# Patient Record
Sex: FEMALE | Race: WHITE | Employment: FULL TIME | URBAN - METROPOLITAN AREA
[De-identification: names, ages, dates, MRNs, and addresses within clinical notes are randomized per-mention and may not be internally consistent; named-entity substitution may affect disease eponyms.]

---

## 2017-03-31 ENCOUNTER — HOSPITAL ENCOUNTER (OUTPATIENT)
Dept: LAB | Age: 22
Discharge: HOME | End: 2017-03-31
Payer: OTHER GOVERNMENT

## 2017-03-31 ENCOUNTER — OFFICE VISIT (OUTPATIENT)
Dept: FAMILY MEDICINE CLINIC | Age: 22
End: 2017-03-31

## 2017-03-31 VITALS
HEIGHT: 65 IN | RESPIRATION RATE: 16 BRPM | TEMPERATURE: 98.9 F | HEART RATE: 84 BPM | DIASTOLIC BLOOD PRESSURE: 62 MMHG | OXYGEN SATURATION: 99 % | SYSTOLIC BLOOD PRESSURE: 102 MMHG | WEIGHT: 200.8 LBS | BODY MASS INDEX: 33.45 KG/M2

## 2017-03-31 DIAGNOSIS — N92.3 SPOTTING BETWEEN MENSES: ICD-10-CM

## 2017-03-31 DIAGNOSIS — Z13.1 SCREENING FOR DIABETES MELLITUS: ICD-10-CM

## 2017-03-31 DIAGNOSIS — Z13.220 SCREENING FOR HYPERLIPIDEMIA: ICD-10-CM

## 2017-03-31 DIAGNOSIS — R53.83 FATIGUE, UNSPECIFIED TYPE: Primary | ICD-10-CM

## 2017-03-31 DIAGNOSIS — R42 DIZZINESS: ICD-10-CM

## 2017-03-31 LAB
25(OH)D3 SERPL-MCNC: 36.6 NG/ML (ref 30–100)
ALBUMIN SERPL BCP-MCNC: 3.9 G/DL (ref 3.4–5)
ALBUMIN/GLOB SERPL: 1.2 {RATIO} (ref 0.8–1.7)
ALP SERPL-CCNC: 74 U/L (ref 45–117)
ALT SERPL-CCNC: 19 U/L (ref 13–56)
ANION GAP BLD CALC-SCNC: 7 MMOL/L (ref 3–18)
AST SERPL W P-5'-P-CCNC: 13 U/L (ref 15–37)
BILIRUB SERPL-MCNC: 0.2 MG/DL (ref 0.2–1)
BUN SERPL-MCNC: 14 MG/DL (ref 7–18)
BUN/CREAT SERPL: 18 (ref 12–20)
CALCIUM SERPL-MCNC: 9.5 MG/DL (ref 8.5–10.1)
CHLORIDE SERPL-SCNC: 108 MMOL/L (ref 100–108)
CHOLEST SERPL-MCNC: 147 MG/DL
CO2 SERPL-SCNC: 26 MMOL/L (ref 21–32)
CREAT SERPL-MCNC: 0.8 MG/DL (ref 0.6–1.3)
ERYTHROCYTE [DISTWIDTH] IN BLOOD BY AUTOMATED COUNT: 12.6 % (ref 11.6–14.5)
EST. AVERAGE GLUCOSE BLD GHB EST-MCNC: 97 MG/DL
GLOBULIN SER CALC-MCNC: 3.2 G/DL (ref 2–4)
GLUCOSE SERPL-MCNC: 88 MG/DL (ref 74–99)
HBA1C MFR BLD: 5 % (ref 4.2–5.6)
HCT VFR BLD AUTO: 42.1 % (ref 35–45)
HDLC SERPL-MCNC: 51 MG/DL (ref 40–60)
HDLC SERPL: 2.9 {RATIO} (ref 0–5)
HGB BLD-MCNC: 14 G/DL (ref 12–16)
LDLC SERPL CALC-MCNC: 89.4 MG/DL (ref 0–100)
LIPID PROFILE,FLP: NORMAL
MCH RBC QN AUTO: 31.3 PG (ref 24–34)
MCHC RBC AUTO-ENTMCNC: 33.3 G/DL (ref 31–37)
MCV RBC AUTO: 94 FL (ref 74–97)
PLATELET # BLD AUTO: 271 K/UL (ref 135–420)
PMV BLD AUTO: 10.2 FL (ref 9.2–11.8)
POTASSIUM SERPL-SCNC: 4.2 MMOL/L (ref 3.5–5.5)
PROT SERPL-MCNC: 7.1 G/DL (ref 6.4–8.2)
RBC # BLD AUTO: 4.48 M/UL (ref 4.2–5.3)
SODIUM SERPL-SCNC: 141 MMOL/L (ref 136–145)
TRIGL SERPL-MCNC: 33 MG/DL (ref ?–150)
TSH SERPL DL<=0.05 MIU/L-ACNC: 1.34 UIU/ML (ref 0.36–3.74)
VLDLC SERPL CALC-MCNC: 6.6 MG/DL
WBC # BLD AUTO: 8.3 K/UL (ref 4.6–13.2)

## 2017-03-31 PROCEDURE — 80053 COMPREHEN METABOLIC PANEL: CPT | Performed by: FAMILY MEDICINE

## 2017-03-31 PROCEDURE — 85027 COMPLETE CBC AUTOMATED: CPT | Performed by: FAMILY MEDICINE

## 2017-03-31 PROCEDURE — 36415 COLL VENOUS BLD VENIPUNCTURE: CPT | Performed by: FAMILY MEDICINE

## 2017-03-31 PROCEDURE — 80061 LIPID PANEL: CPT | Performed by: FAMILY MEDICINE

## 2017-03-31 PROCEDURE — 82306 VITAMIN D 25 HYDROXY: CPT | Performed by: FAMILY MEDICINE

## 2017-03-31 PROCEDURE — 83036 HEMOGLOBIN GLYCOSYLATED A1C: CPT | Performed by: FAMILY MEDICINE

## 2017-03-31 PROCEDURE — 84443 ASSAY THYROID STIM HORMONE: CPT | Performed by: FAMILY MEDICINE

## 2017-03-31 RX ORDER — ACETAMINOPHEN 500 MG
TABLET ORAL
COMMUNITY

## 2017-03-31 RX ORDER — IBUPROFEN 200 MG
600 TABLET ORAL
COMMUNITY

## 2017-03-31 NOTE — PROGRESS NOTES
HISTORY OF PRESENT ILLNESS  Kym Mueller is a 22 y.o. female. HPI: here as a new patient with c/o feeling dizziness since one week. Sudden onset. Denies any cold symptoms. Noted with change in position mainly sitting to standing. Last for few seconds. Herself spins around. Was happening few times a day but now it has improved some. Not associated with headache, nausea or vomiting. No vision changes. Does wear prescription glasses. No chest pain or sob. No ext weakness. No urinary or bowel complains. No abdominal pain. Concern with fatigue lately. Wanted to get check her thyroid level. No known thyroid abnormality. Denies any heat or cold intolerance. No mood changes. Feels like she is eating healthy and doing exercise and hard time loosing weight. Has has lost weight before but now struggling. 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 Drinking fair amount of water. Having spotting between period. First time this month. Has nexplanon placed last year by ob/gyn. First episode. ROS: see HPI     Physical Exam   Constitutional: She is oriented to person, place, and time. No distress. HENT:   Ears: TM intact ,normal light reflex bilaterally    Neck: No thyromegaly present. Cardiovascular: Normal rate, regular rhythm and normal heart sounds. Pulmonary/Chest:   CTA   Abdominal: Soft. Bowel sounds are normal. There is no tenderness. Musculoskeletal: She exhibits no edema. Lymphadenopathy:     She has no cervical adenopathy. Neurological: She is oriented to person, place, and time. Psychiatric: Her behavior is normal.       ASSESSMENT and PLAN    ICD-10-CM ICD-9-CM    1. Fatigue, unspecified type: will check labs and f/u after lab result. R53.83 780.79 TSH 3RD GENERATION      CBC W/O DIFF      METABOLIC PANEL, COMPREHENSIVE      VITAMIN D, 25 HYDROXY   2. Spotting between menses: for now observe. First time episode. On nexplanon. Could be side effects.  Placed one year ago. Will observe and if persist will consider ob/gyn follow up. N92.0 626.6    3. Dizziness: could be vertigo. For now will observe. Ordered labs. Advised to eat on time and drink more fluid. Recommend meclizine as needed but she wants to hold off of it. R42 780.4 TSH 3RD GENERATION      CBC W/O DIFF      METABOLIC PANEL, COMPREHENSIVE   4. Screening for diabetes mellitus Z13.1 V77.1 HEMOGLOBIN A1C WITH EAG   5. Screening for hyperlipidemia Z13.220 V77.91 LIPID PANEL   Pt understood and agrees with above plan. Review HM   She will provide immunization records. She thinks he had all 3 shots of HPV. Will check records. Follow-up Disposition:  Return in about 1 month (around 4/30/2017).

## 2017-03-31 NOTE — PROGRESS NOTES
1. Have you been to the ER, urgent care clinic since your last visit? Hospitalized since your last visit? No    2. Have you seen or consulted any other health care providers outside of the [address] [place] [address] since your last visit? Include any pap smears or colon screening. Yes OB/GYN, 3/2016      Patient had flu vaccine in 9/2016. Patient was not sure if her grandparents had arthritis.

## 2017-03-31 NOTE — PATIENT INSTRUCTIONS
Fatigue: Care Instructions  Your Care Instructions  Fatigue is a feeling of tiredness, exhaustion, or lack of energy. You may feel fatigue because of too much or not enough activity. It can also come from stress, lack of sleep, boredom, and poor diet. Many medical problems, such as viral infections, can cause fatigue. Emotional problems, especially depression, are often the cause of fatigue. Fatigue is most often a symptom of another problem. Treatment for fatigue depends on the cause. For example, if you have fatigue because you have a certain health problem, treating this problem also treats your fatigue. If depression or anxiety is the cause, treatment may help. Follow-up care is a key part of your treatment and safety. Be sure to make and go to all appointments, and call your doctor if you are having problems. It's also a good idea to know your test results and keep a list of the medicines you take. How can you care for yourself at home? · Get regular exercise. But don't overdo it. Go back and forth between rest and exercise. · Get plenty of rest.  · Eat a healthy diet. Do not skip meals, especially breakfast.  · Reduce your use of caffeine, tobacco, and alcohol. Caffeine is most often found in coffee, tea, cola drinks, and chocolate. · Limit medicines that can cause fatigue. This includes tranquilizers and cold and allergy medicines. When should you call for help? Watch closely for changes in your health, and be sure to contact your doctor if:  · You have new symptoms such as fever or a rash. · Your fatigue gets worse. · You have been feeling down, depressed, or hopeless. Or you may have lost interest in things that you usually enjoy. · You are not getting better as expected. Where can you learn more? Go to http://michael-coco.info/. Enter R272 in the search box to learn more about \"Fatigue: Care Instructions. \"  Current as of: May 27, 2016  Content Version: 11.2  © 1614-9945 Healthwise, [employer]. Care instructions adapted under license by [employer] (which disclaims liability or warranty for this information). If you have questions about a medical condition or this instruction, always ask your healthcare professional. [address] any warranty or liability for your use of this information. Vertigo: Care Instructions  Your Care Instructions  Vertigo is the feeling that you or your surroundings are moving when there is no actual movement. It is often described as a feeling of spinning, whirling, falling, or tilting. Vertigo may make you vomit or feel nauseated. You may have trouble standing or walking and may lose your balance. Vertigo is often related to an inner ear problem, but it can have other more serious causes. If vertigo continues, you may need more tests to find its cause. Follow-up care is a key part of your treatment and safety. Be sure to make and go to all appointments, and call your doctor if you are having problems. Its also a good idea to know your test results and keep a list of the medicines you take. How can you care for yourself at home? · Do not lie flat on your back. Prop yourself up slightly. This may reduce the spinning feeling. Keep your eyes open. · Move slowly so that you do not fall. · If your doctor recommends medicine, take it exactly as directed. · Do not drive while you are having vertigo. Certain exercises, called Olvera-Daroff exercises, can help decrease vertigo. To do Olvera-Daroff exercises:  · Sit on the edge of a bed or sofa and quickly lie down on the side that causes the worst vertigo. Lie on your side with your ear down. · Stay in this position for at least 30 seconds or until the vertigo goes away. · Sit up. If this causes vertigo, wait for it to stop. · Repeat the procedure on the other side. · Repeat this 10 times.  Do these exercises 2 times a day until the vertigo is gone.  When should you call for help? Call 911 anytime you think you may need emergency care. For example, call if:  · You passed out (lost consciousness). · You have symptoms of a stroke. These may include:  ¨ Sudden numbness, tingling, weakness, or loss of movement in your face, arm, or leg, especially on only one side of your body. ¨ Sudden vision changes. ¨ Sudden trouble speaking. ¨ Sudden confusion or trouble understanding simple statements. ¨ Sudden problems with walking or balance. ¨ A sudden, severe headache that is different from past headaches. Call your doctor now or seek immediate medical care if:  · Vertigo occurs with a fever, a headache, or ringing in your ears. · You have new or increased nausea and vomiting. Watch closely for changes in your health, and be sure to contact your doctor if:  · Vertigo gets worse or happens more often. · Vertigo has not gotten better after 2 weeks. Where can you learn more? Go to http://michael-coco.info/. Enter P866 in the search box to learn more about \"Vertigo: Care Instructions. \"  Current as of: July 29, 2016  Content Version: 11.2  © 6298-6949 [employer]. Care instructions adapted under license by [employer] (which disclaims liability or warranty for this information). If you have questions about a medical condition or this instruction, always ask your healthcare professional. [place] [address] any warranty or liability for your use of this information.

## 2017-03-31 NOTE — MR AVS SNAPSHOT
Visit Information Date & Time Provider Department Dept. Phone Encounter #  
 3/31/2017  1:45 PM Raman Gonzalez, [address] [id number] Follow-up Instructions Return in about 1 month (around 4/30/2017). Follow-up and Disposition History Upcoming Health Maintenance Date Due  
 HPV AGE 9Y-34Y (1 of 3 - Female 3 Dose Series) 5/27/2007 Hepatitis A Peds Age 1-18 (2 of 3 - Twinrix 3-Dose Series) 4/28/2017 DTaP/Tdap/Td series (2 - Td) 4/28/2017 Allergies as of 3/31/2017  Review Complete On: 3/31/2017 By: Idella Closs No Known Allergies Current Immunizations  Never Reviewed Name Date Influenza Vaccine 9/1/2016 Not reviewed this visit You Were Diagnosed With   
  
 Codes Comments Fatigue, unspecified type    -  Primary ICD-10-CM: R53.83 ICD-9-CM: 780.79 Spotting between menses     ICD-10-CM: N92.0 ICD-9-CM: 626.6 Dizziness     ICD-10-CM: A27 ICD-9-CM: 780.4 Screening for diabetes mellitus     ICD-10-CM: Z13.1 ICD-9-CM: V77.1 Screening for hyperlipidemia     ICD-10-CM: Z13.220 ICD-9-CM: V77.91 Vitals BP Pulse Temp Resp Height(growth percentile) Weight(growth percentile) 102/62 (BP 1 Location: Left arm, BP Patient Position: Sitting) 84 98.9 °F (37.2 °C) (Oral) 16 5' 5.25\" (1.657 m) 200 lb 12.8 oz (91.1 kg) LMP SpO2 BMI OB Status Smoking Status 03/06/2017 99% 33.16 kg/m2 Having regular periods Never Smoker BMI and BSA Data Body Mass Index Body Surface Area  
 33.16 kg/m 2 2.05 m 2 Your Updated Medication List  
  
   
This list is accurate as of: 3/31/17  2:39 PM.  Always use your most recent med list.  
  
  
  
  
 ibuprofen 200 mg tablet Commonly known as:  MOTRIN Take  by mouth. NEXPLANON 68 mg Impl Generic drug:  etonogestrel  
by SubDERmal route. TYLENOL EXTRA STRENGTH 500 mg tablet Generic drug:  acetaminophen Take  by mouth every six (6) hours as needed for Pain. Follow-up Instructions Return in about 1 month (around 4/30/2017). To-Do List   
 03/31/2017 Lab:  CBC W/O DIFF   
  
 03/31/2017 Lab:  HEMOGLOBIN A1C WITH EAG   
  
 03/31/2017 Lab:  LIPID PANEL   
  
 03/31/2017 Lab:  METABOLIC PANEL, COMPREHENSIVE   
  
 03/31/2017 Lab:  TSH 3RD GENERATION   
  
 03/31/2017 Lab:  VITAMIN D, 25 HYDROXY Patient Instructions Fatigue: Care Instructions Your Care Instructions Fatigue is a feeling of tiredness, exhaustion, or lack of energy. You may feel fatigue because of too much or not enough activity. It can also come from stress, lack of sleep, boredom, and poor diet. Many medical problems, such as viral infections, can cause fatigue. Emotional problems, especially depression, are often the cause of fatigue. Fatigue is most often a symptom of another problem. Treatment for fatigue depends on the cause. For example, if you have fatigue because you have a certain health problem, treating this problem also treats your fatigue. If depression or anxiety is the cause, treatment may help. Follow-up care is a key part of your treatment and safety. Be sure to make and go to all appointments, and call your doctor if you are having problems. It's also a good idea to know your test results and keep a list of the medicines you take. How can you care for yourself at home? · Get regular exercise. But don't overdo it. Go back and forth between rest and exercise. · Get plenty of rest. 
· Eat a healthy diet. Do not skip meals, especially breakfast. 
· Reduce your use of caffeine, tobacco, and alcohol. Caffeine is most often found in coffee, tea, cola drinks, and chocolate. · Limit medicines that can cause fatigue. This includes tranquilizers and cold and allergy medicines. When should you call for help?  
Watch closely for changes in your health, and be sure to contact your doctor if: 
· You have new symptoms such as fever or a rash. · Your fatigue gets worse. · You have been feeling down, depressed, or hopeless. Or you may have lost interest in things that you usually enjoy. · You are not getting better as expected. Where can you learn more? Go to http://michael-coco.info/. Enter H329 in the search box to learn more about \"Fatigue: Care Instructions. \" Current as of: May 27, 2016 Content Version: 11.2 © 9299-4603 Dr [employer]. Care instructions adapted under license by [employer] (which disclaims liability or warranty for this information). If you have questions about a medical condition or this instruction, always ask your healthcare professional. [address] any warranty or liability for your use of this information. Vertigo: Care Instructions Your Care Instructions Vertigo is the feeling that you or your surroundings are moving when there is no actual movement. It is often described as a feeling of spinning, whirling, falling, or tilting. Vertigo may make you vomit or feel nauseated. You may have trouble standing or walking and may lose your balance. Vertigo is often related to an inner ear problem, but it can have other more serious causes. If vertigo continues, you may need more tests to find its cause. Follow-up care is a key part of your treatment and safety. Be sure to make and go to all appointments, and call your doctor if you are having problems. Its also a good idea to know your test results and keep a list of the medicines you take. How can you care for yourself at home? · Do not lie flat on your back. Prop yourself up slightly. This may reduce the spinning feeling. Keep your eyes open. · Move slowly so that you do not fall. · If your doctor recommends medicine, take it exactly as directed. · Do not drive while you are having vertigo. Certain exercises, called Olvera-Daroff exercises, can help decrease vertigo. To do Olvera-Daroff exercises: · Sit on the edge of a bed or sofa and quickly lie down on the side that causes the worst vertigo. Lie on your side with your ear down. · Stay in this position for at least 30 seconds or until the vertigo goes away. · Sit up. If this causes vertigo, wait for it to stop. · Repeat the procedure on the other side. · Repeat this 10 times. Do these exercises 2 times a day until the vertigo is gone. When should you call for help? Call 911 anytime you think you may need emergency care. For example, call if: 
· You passed out (lost consciousness). · You have symptoms of a stroke. These may include: 
¨ Sudden numbness, tingling, weakness, or loss of movement in your face, arm, or leg, especially on only one side of your body. ¨ Sudden vision changes. ¨ Sudden trouble speaking. ¨ Sudden confusion or trouble understanding simple statements. ¨ Sudden problems with walking or balance. ¨ A sudden, severe headache that is different from past headaches. Call your doctor now or seek immediate medical care if: · Vertigo occurs with a fever, a headache, or ringing in your ears. · You have new or increased nausea and vomiting. Watch closely for changes in your health, and be sure to contact your doctor if: · Vertigo gets worse or happens more often. · Vertigo has not gotten better after 2 weeks. Where can you learn more? Go to http://michael-coco.info/. Enter S477 in the search box to learn more about \"Vertigo: Care Instructions. \" Current as of: July 29, 2016 Content Version: 11.2 © 7599-6516 [employer]. Care instructions adapted under license by [employer] (which disclaims liability or warranty for this information).  If you have questions about a medical condition or this instruction, always ask your healthcare professional. [employer] disclaims any warranty or liability for your use of this information. Introducing [facility]! [employer] introduces Procyrion patient portal. Now you can access parts of your medical record, email your doctor's office, and request medication refills online. 1. In your internet browser, go to [email or web address] 2. Click on the First Time User? Click Here link in the Sign In box. You will see the New Member Sign Up page. 3. Enter your Procyrion Access Code exactly as it appears below. You will not need to use this code after youve completed the sign-up process. If you do not sign up before the expiration date, you must request a new code. · Procyrion Access Code: [email or web address] Expires: 6/29/2017  8:10 AM 
 
4. Enter the last four digits of your Social Security Number (xxxx) and Date of Birth (mm/dd/yyyy) as indicated and click Submit. You will be taken to the next sign-up page. 5. Create a Procyrion ID. This will be your Procyrion login ID and cannot be changed, so think of one that is secure and easy to remember. 6. Create a Procyrion password. You can change your password at any time. 7. Enter your Password Reset Question and Answer. This can be used at a later time if you forget your password. 8. Enter your e-mail address. You will receive e-mail notification when new information is available in [address] 9. Click Sign Up. You can now view and download portions of your medical record. 10. Click the Download Summary menu link to download a portable copy of your medical information. If you have questions, please visit the Frequently Asked Questions section of the Procyrion website. Remember, Procyrion is NOT to be used for urgent needs. For medical emergencies, dial 911. Now available from your iPhone and Android! Please provide this summary of care documentation to your next provider. Your primary care clinician is listed as Ari Thrasher. If you have any questions after today's visit, please call [phone].

## 2017-04-05 NOTE — PROGRESS NOTES
Spoke with patient (2 verifiers name/) regarding labs are ok and further discussion on follow up visit. Patient voiced understanding.

## 2017-04-06 ENCOUNTER — TELEPHONE (OUTPATIENT)
Dept: FAMILY MEDICINE CLINIC | Age: 22
End: 2017-04-06

## 2017-04-06 NOTE — TELEPHONE ENCOUNTER
Pt is requesting her shot record for school. Please update her record and call her if you have any questions.

## 2018-01-03 ENCOUNTER — OFFICE VISIT (OUTPATIENT)
Dept: CARDIOLOGY CLINIC | Age: 23
End: 2018-01-03

## 2018-01-03 VITALS
SYSTOLIC BLOOD PRESSURE: 135 MMHG | DIASTOLIC BLOOD PRESSURE: 88 MMHG | BODY MASS INDEX: 31.32 KG/M2 | WEIGHT: 188 LBS | HEIGHT: 65 IN | HEART RATE: 88 BPM

## 2018-01-03 DIAGNOSIS — Z87.74 S/P PDA REPAIR: ICD-10-CM

## 2018-01-03 DIAGNOSIS — Z02.1 PRE-EMPLOYMENT EXAMINATION: Primary | ICD-10-CM

## 2018-01-03 NOTE — PROGRESS NOTES
HISTORY OF PRESENT ILLNESS  Tesha Vital is a 25 y.o. female. HPI Comments: Patient with PDA coil embolization at age 3  No problem since  On follow up patient denies any chest pains,sob, palpitation or other significant symptoms. New Patient   The history is provided by the patient. Pertinent negatives include no chest pain, no abdominal pain, no headaches and no shortness of breath. Review of Systems   Constitutional: Negative for chills and fever. HENT: Negative for nosebleeds. Eyes: Negative for blurred vision and double vision. Respiratory: Negative for cough, hemoptysis, sputum production, shortness of breath and wheezing. Cardiovascular: Negative for chest pain, palpitations, orthopnea, claudication, leg swelling and PND. Gastrointestinal: Negative for abdominal pain, heartburn, nausea and vomiting. Musculoskeletal: Negative for myalgias. Skin: Negative for rash. Neurological: Negative for dizziness, weakness and headaches. Endo/Heme/Allergies: Does not bruise/bleed easily. Family History   Problem Relation Age of Onset    Attention Deficit Disorder Mother     Hypertension Maternal Grandmother     High Cholesterol Maternal Grandmother     Thyroid Disease Maternal Grandmother     Hypertension Maternal Grandfather     High Cholesterol Maternal Grandfather        Past Medical History:   Diagnosis Date    Headache     Migraine     S/P PDA repair        Past Surgical History:   Procedure Laterality Date    CARDIAC SURG PROCEDURE UNLIST      HX HEART CATHETERIZATION      pda coil embolisation at age 3       Social History   Substance Use Topics    Smoking status: Never Smoker    Smokeless tobacco: Never Used    Alcohol use Yes      Comment: occ       No Known Allergies    Prior to Admission medications    Medication Sig Start Date End Date Taking? Authorizing Provider   etonogestrel (NEXPLANON) 68 mg impl by SubDERmal route.    Yes Historical Provider acetaminophen (TYLENOL EXTRA STRENGTH) 500 mg tablet Take  by mouth every six (6) hours as needed for Pain. Yes Historical Provider   ibuprofen (MOTRIN) 200 mg tablet Take  by mouth. Yes Historical Provider   loratadine (CLARITIN) 10 mg tablet Take 10 mg by mouth daily. Yes Phys Other, MD   NAPROXEN (NAPROSYN PO) Take  by mouth. Yes Phys Other, MD   ondansetron hcl (ZOFRAN) 4 mg tablet Take 1 Tab by mouth every eight (8) hours as needed for Nausea. 5/16/13   ADRIENNE Schafer         Visit Vitals    /88    Pulse 88    Ht 5' 5.25\" (1.657 m)    Wt 85.3 kg (188 lb)    BMI 31.05 kg/m2         Physical Exam   Constitutional: She is oriented to person, place, and time. She appears well-developed and well-nourished. HENT:   Head: Normocephalic and atraumatic. Eyes: Conjunctivae are normal.   Neck: Neck supple. No JVD present. No tracheal deviation present. No thyromegaly present. Cardiovascular: Normal rate and regular rhythm. PMI is not displaced. Exam reveals no gallop, no S3 and no decreased pulses. No murmur heard. Pulmonary/Chest: No respiratory distress. She has no wheezes. She has no rales. She exhibits no tenderness. Abdominal: Soft. There is no tenderness. Musculoskeletal: She exhibits no edema. Neurological: She is alert and oriented to person, place, and time. Skin: Skin is warm. Psychiatric: She has a normal mood and affect. Ms. Souleymane Hernandez has a reminder for a \"due or due soon\" health maintenance. I have asked that she contact her primary care provider for follow-up on this health maintenance.  ekg-1/2018  Sinus  Rhythm   -RSR(V1) -probably normal for age. PROBABLY NORMAL FOR AGE      Assessment         ICD-10-CM ICD-9-CM    1. Pre-employment examination Z02.1 V70.5 AMB POC EKG ROUTINE W/ 12 LEADS, INTER & REP    joining [employer]     2.  S/P PDA repair Z98.890 V45.89     Z87.74      coil embolisation   Asymptomatic s/p pda repair-good functional capacity  No restriction on activity  Cleared for [employer] employment    There are no discontinued medications. Orders Placed This Encounter    AMB POC EKG ROUTINE W/ 12 LEADS, INTER & REP     Order Specific Question:   Reason for Exam:     Answer:   employment       Follow-up Disposition:  Return if symptoms worsen or fail to improve.

## 2018-01-03 NOTE — MR AVS SNAPSHOT
Visit Information Date & Time Provider Department Dept. Phone Encounter #  
 1/3/2018  9:15 AM Courtney Han MD [facility] [address] [place] [address] [id number] Follow-up Instructions Return if symptoms worsen or fail to improve. Upcoming Health Maintenance Date Due  
 PAP AKA CERVICAL CYTOLOGY 5/27/2017 Influenza Age 5 to Adult 8/1/2017 DTaP/Tdap/Td series (8 - Td) 3/31/2027 Allergies as of 1/3/2018  Review Complete On: 1/3/2018 By: Courtney Han MD  
 No Known Allergies Current Immunizations  Never Reviewed Name Date DTaP 7/9/2001, 10/23/1997, 1996, 1996, 1996 HPV 7/30/2012, 11/15/2011, 1/17/2011 Hep A Vaccine 8/24/2009, 8/28/2007 Hep B Vaccine 4/22/1997, 1996, 1996 Influenza Vaccine 9/1/2016 MMR 7/9/2001, 10/23/1997 Meningococcal (MCV4O) Vaccine 11/14/2014 Meningococcal ACWY Vaccine 8/28/2007 Poliovirus vaccine 7/9/2001, 1996, 1996, 1996 Tdap 8/20/2007 Varicella Virus Vaccine 8/24/2009, 6/23/1997 Not reviewed this visit You Were Diagnosed With   
  
 Codes Comments Pre-employment examination    -  Primary ICD-10-CM: Z02.1 ICD-9-CM: V70.5 joining [employer] S/P PDA repair     ICD-10-CM: [id number], Z87.74 ICD-9-CM: V45.89 coil embolisation Vitals BP Pulse Height(growth percentile) Weight(growth percentile) BMI OB Status 135/88 88 5' 5.25\" (1.657 m) 188 lb (85.3 kg) 31.05 kg/m2 Having regular periods Smoking Status Never Smoker Vitals History BMI and BSA Data Body Mass Index Body Surface Area 31.05 kg/m 2 1.98 m 2 Your Updated Medication List  
  
   
This list is accurate as of: 1/3/18  9:37 AM.  Always use your most recent med list.  
  
  
  
  
 CLARITIN 10 mg tablet Generic drug:  loratadine Take 10 mg by mouth daily. ibuprofen 200 mg tablet Commonly known as:  MOTRIN Take  by mouth. NAPROSYN PO Take  by mouth. NEXPLANON 68 mg Impl Generic drug:  etonogestrel  
by SubDERmal route. ondansetron hcl 4 mg tablet Commonly known as:  ZOFRAN (AS HYDROCHLORIDE) Take 1 Tab by mouth every eight (8) hours as needed for Nausea. TYLENOL EXTRA STRENGTH 500 mg tablet Generic drug:  acetaminophen Take  by mouth every six (6) hours as needed for Pain. We Performed the Following AMB POC EKG ROUTINE W/ 12 LEADS, INTER & REP [78704 CPT(R)] Follow-up Instructions Return if symptoms worsen or fail to improve. Patient Instructions Cleared for any employment activity without restriction Introducing [facility]! [facility] introduces Insplorion patient portal. Now you can access parts of your medical record, email your doctor's office, and request medication refills online. 1. In your internet browser, go to [email or web address] 2. Click on the First Time User? Click Here link in the Sign In box. You will see the New Member Sign Up page. 3. Enter your Insplorion Access Code exactly as it appears below. You will not need to use this code after youve completed the sign-up process. If you do not sign up before the expiration date, you must request a new code. · Insplorion Access Code: [email or web address] Expires: 4/3/2018  9:07 AM 
 
4. Enter the last four digits of your Social Security Number (xxxx) and Date of Birth (mm/dd/yyyy) as indicated and click Submit. You will be taken to the next sign-up page. 5. Create a WebLink Internationalt ID. This will be your Insplorion login ID and cannot be changed, so think of one that is secure and easy to remember. 6. Create a Insplorion password. You can change your password at any time. 7. Enter your Password Reset Question and Answer. This can be used at a later time if you forget your password. 8. Enter your e-mail address.  You will receive e-mail notification when new information is available in Appia. 9. Click Sign Up. You can now view and download portions of your medical record. 10. Click the Download Summary menu link to download a portable copy of your medical information. If you have questions, please visit the Frequently Asked Questions section of the Appia website. Remember, Appia is NOT to be used for urgent needs. For medical emergencies, dial 911. Now available from your iPhone and Android! Please provide this summary of care documentation to your next provider. Your primary care clinician is listed as Gearld Sayer. If you have any questions after today's visit, please call [phone].

## 2018-01-03 NOTE — LETTER
[facility] 1995 
 
1/3/2018 Dear Ely Horan MD 
 
I had the pleasure of evaluating  Ms. Arpita Yarbrough in office today. Below are the relevant portions of my assessment and plan of care. ICD-10-CM ICD-9-CM 1. Pre-employment examination Z02.1 V70.5 AMB POC EKG ROUTINE W/ 12 LEADS, INTER & REP  
 joining [employer] 2. S/P PDA repair Z98.890 V45.89   
 Z87.74    
 coil embolisation Current Outpatient Prescriptions Medication Sig Dispense Refill  etonogestrel (NEXPLANON) 68 mg impl by SubDERmal route.  acetaminophen (TYLENOL EXTRA STRENGTH) 500 mg tablet Take  by mouth every six (6) hours as needed for Pain.  ibuprofen (MOTRIN) 200 mg tablet Take  by mouth.  loratadine (CLARITIN) 10 mg tablet Take 10 mg by mouth daily.  NAPROXEN (NAPROSYN PO) Take  by mouth.  ondansetron hcl (ZOFRAN) 4 mg tablet Take 1 Tab by mouth every eight (8) hours as needed for Nausea. 12 Tab 0 Orders Placed This Encounter  AMB POC EKG ROUTINE W/ 12 LEADS, INTER & REP Order Specific Question:   Reason for Exam: Answer:   employment If you have questions, please do not hesitate to call me. I look forward to following Ms. Arpita Yarbrough along with you. Sincerely, Vishnu Cristina MD

## 2018-02-06 ENCOUNTER — TELEPHONE (OUTPATIENT)
Dept: FAMILY MEDICINE CLINIC | Age: 23
End: 2018-02-06

## 2018-02-06 NOTE — TELEPHONE ENCOUNTER
Pt was seen by Dr. Estiven Patricia in jan 2018. Please ask her to make an appt with them by calling their office. Thanks.

## 2018-02-06 NOTE — TELEPHONE ENCOUNTER
Patient states she is trying to get into the [facility] and they are requiring her to get an ECHO test. Patient would like to be referred to see Dr. Fernando Cai. LOV: 3/31/17. Patient was informed that message will be sent to Dr. Carly Ayala but appointment will probably be recommended. Patient voiced understanding and requesting message to be sent. Patient is requesting (New  Updated) referral to the following office:    Speciality:Cardiology  Specialist Name: Dr. Fernando Cai  Office Location: [address] [place] [address]  Phone (if available): [phone]  Fax (if available): [phone]  Diagnosis:   Procedure: Echo test  Date of appointment (if scheduled): No appt.

## 2018-02-07 NOTE — TELEPHONE ENCOUNTER
Patient called returning nurse Shahla's phone call. Informed patient per Dr. Devaughn Haskins to call Dr. Leslie Lucas office and schedule appointment. She states she did speak to 's office and they stated our office would have to call and schedule Echo.  Please call patient back per request.

## 2018-02-09 ENCOUNTER — TELEPHONE (OUTPATIENT)
Dept: CARDIOLOGY CLINIC | Age: 23
End: 2018-02-09

## 2018-02-09 DIAGNOSIS — Z02.1 PRE-EMPLOYMENT EXAMINATION: ICD-10-CM

## 2018-02-09 DIAGNOSIS — Z87.74 S/P PDA REPAIR: Primary | ICD-10-CM

## 2018-02-09 NOTE — TELEPHONE ENCOUNTER
Spoke with Dr Juanis Echeverria, he will order echo then follow up.     Called and left message for patient to call office to schedule echo then follow up

## 2018-02-09 NOTE — TELEPHONE ENCOUNTER
Dr Diane Garcia has spoken with CARISSA Vann and his office will contact the patient to set up echo in his office.   Closing encounter

## 2018-02-09 NOTE — TELEPHONE ENCOUNTER
She needs echo before employment  Please set up and same day follow up  H/o pda repair  Check pre auth need

## 2018-02-09 NOTE — TELEPHONE ENCOUNTER
Spoke with patient (2 verifiers name/) regarding needing an echo ordered by Dr Diane Garcia in order to be able to join the [employer]. Patient stated she had a procedure done on her heart to close a valve when she was [age] and the [employer] is needing the patient to have the echo performed. Patient stated she has had the EKG done already which came back normal.  Patient informed Dr Diane Garcia will be given the message. Patient voiced understanding.

## 2018-02-13 ENCOUNTER — CLINICAL SUPPORT (OUTPATIENT)
Dept: CARDIOLOGY CLINIC | Age: 23
End: 2018-02-13

## 2018-02-13 ENCOUNTER — OFFICE VISIT (OUTPATIENT)
Dept: CARDIOLOGY CLINIC | Age: 23
End: 2018-02-13

## 2018-02-13 VITALS
SYSTOLIC BLOOD PRESSURE: 123 MMHG | DIASTOLIC BLOOD PRESSURE: 71 MMHG | BODY MASS INDEX: 35.88 KG/M2 | HEART RATE: 84 BPM | HEIGHT: 62 IN | WEIGHT: 195 LBS

## 2018-02-13 DIAGNOSIS — Z87.74 S/P PDA REPAIR: ICD-10-CM

## 2018-02-13 DIAGNOSIS — Z87.74 S/P PDA REPAIR: Primary | ICD-10-CM

## 2018-02-13 DIAGNOSIS — Z02.1 PRE-EMPLOYMENT EXAMINATION: ICD-10-CM

## 2018-02-13 NOTE — PROGRESS NOTES
HISTORY OF PRESENT ILLNESS  Mark Anthony Alvarado is a 25 y.o. female. HPI Comments: Patient with PDA coil embolization at age 3  No problem since  On follow up patient denies any chest pains,sob, palpitation or other significant symptoms. Review of Systems   Constitutional: Negative for chills and fever. HENT: Negative for nosebleeds. Eyes: Negative for blurred vision and double vision. Respiratory: Negative for cough, hemoptysis, sputum production and wheezing. Cardiovascular: Negative for palpitations, orthopnea, claudication, leg swelling and PND. Gastrointestinal: Negative for heartburn, nausea and vomiting. Musculoskeletal: Negative for myalgias. Skin: Negative for rash. Neurological: Negative for dizziness and weakness. Endo/Heme/Allergies: Does not bruise/bleed easily. Family History   Problem Relation Age of Onset    Attention Deficit Disorder Mother     Hypertension Maternal Grandmother     High Cholesterol Maternal Grandmother     Thyroid Disease Maternal Grandmother     Hypertension Maternal Grandfather     High Cholesterol Maternal Grandfather        Past Medical History:   Diagnosis Date    Headache     Migraine     S/P PDA repair        Past Surgical History:   Procedure Laterality Date    CARDIAC SURG PROCEDURE UNLIST      HX HEART CATHETERIZATION      pda coil embolisation at age 3       Social History   Substance Use Topics    Smoking status: Never Smoker    Smokeless tobacco: Never Used    Alcohol use Yes      Comment: occ       No Known Allergies    Prior to Admission medications    Medication Sig Start Date End Date Taking? Authorizing Provider   etonogestrel (NEXPLANON) 68 mg impl by SubDERmal route. Yes Historical Provider   acetaminophen (TYLENOL EXTRA STRENGTH) 500 mg tablet Take  by mouth every six (6) hours as needed for Pain. Yes Historical Provider   ibuprofen (MOTRIN) 200 mg tablet Take  by mouth.    Yes Historical Provider   loratadine (CLARITIN) 10 mg tablet Take 10 mg by mouth daily. Yes Phys Other, MD   NAPROXEN (NAPROSYN PO) Take  by mouth. Yes Phys Other, MD   ondansetron hcl (ZOFRAN) 4 mg tablet Take 1 Tab by mouth every eight (8) hours as needed for Nausea. 5/16/13  Yes ADRIENNE Layne         Visit Vitals    /71    Pulse 84    Ht 5' 2\" (1.575 m)    Wt 88.5 kg (195 lb)    BMI 35.67 kg/m2         Physical Exam   Constitutional: She is oriented to person, place, and time. She appears well-developed and well-nourished. HENT:   Head: Normocephalic and atraumatic. Eyes: Conjunctivae are normal.   Neck: Neck supple. No JVD present. No tracheal deviation present. No thyromegaly present. Cardiovascular: Normal rate and regular rhythm. PMI is not displaced. Exam reveals no gallop, no S3 and no decreased pulses. No murmur heard. Pulmonary/Chest: No respiratory distress. She has no wheezes. She has no rales. She exhibits no tenderness. Abdominal: Soft. There is no tenderness. Musculoskeletal: She exhibits no edema. Neurological: She is alert and oriented to person, place, and time. Skin: Skin is warm. Psychiatric: She has a normal mood and affect. Ms. Stan Guevara has a reminder for a \"due or due soon\" health maintenance. I have asked that she contact her primary care provider for follow-up on this health maintenance.  ekg-1/2018  Sinus  Rhythm   -RSR(V1) -probably normal for age. PROBABLY NORMAL FOR AGE      Assessment         ICD-10-CM ICD-9-CM    1. S/P PDA repair U67.250 V45.89 CTA CHEST W OR W WO CONT    Z87.74      no flow  occluder devive in place  normal rv and lv function   Asymptomatic s/p pda repair-good functional capacity-good pda closure on echo  No restriction on activity  Cleared for [employer]  cta ordered to assess aortic arch anomaly  There are no discontinued medications.     Orders Placed This Encounter    CTA CHEST W OR W WO CONT     Standing Status:   Future     Standing Expiration Date:   3/13/2019     Order Specific Question:   Is Patient Allergic to Contrast Dye? Answer:   No     Order Specific Question:   Is Patient Pregnant? Answer:   No     Order Specific Question:   STAT Creatinine as indicated     Answer:   Yes       Follow-up Disposition:  Return for F/u after tests.

## 2018-02-13 NOTE — MR AVS SNAPSHOT
[facility] [place] 
 
 
 [address] 
[phone] Patient: Warren Nicolas MRN: [id number] :1995 Visit Information Date & Time Provider Department Dept. Phone Encounter #  
 2018  1:00 PM Philip Ni MD [facility] [phone] Follow-up Instructions Return for F/u after tests. Your Appointments 2018  1:00 PM  
Follow Up with Philip Ni MD  
[facility] ([facility]) Appt Note: echo same day follow up  
 [address] [facility] [address]  
  
   
 [address] [id number] [place] [address]  
  
    
 3/30/2018 11:00 AM  
Follow Up with Philip Ni MD  
[facility] ([facility]) Appt Note: cta follow up  
 [address]  
[phone] Upcoming Health Maintenance Date Due  
 PAP AKA CERVICAL CYTOLOGY 2017 Influenza Age 5 to Adult 2017 DTaP/Tdap/Td series (8 - Td) 3/31/2027 Allergies as of 2018  Review Complete On: 2018 By: Philip Ni MD  
 No Known Allergies Current Immunizations  Never Reviewed Name Date DTaP 2001, 10/23/1997, 1996, 1996, 1996 HPV 2012, 11/15/2011, 2011 Hep A Vaccine 2009, 2007 Hep B Vaccine 1997, 1996, 1996 Influenza Vaccine 2016 MMR 2001, 10/23/1997 Meningococcal (MCV4O) Vaccine 2014 Meningococcal ACWY Vaccine 2007 Poliovirus vaccine 2001, 1996, 1996, 1996 Tdap 2007 Varicella Virus Vaccine 2009, 1997 Not reviewed this visit You Were Diagnosed With   
  
 Codes Comments S/P PDA repair    -  Primary ICD-10-CM: M02.262, Z87.74 ICD-9-CM: V45.89 no flow 
occluder devive in place 
normal rv and lv function Vitals BP Pulse Height(growth percentile) Weight(growth percentile) BMI OB Status 123/71 84 5' 2\" (1.575 m) 195 lb (88.5 kg) 35.67 kg/m2 Having regular periods Smoking Status Never Smoker Vitals History BMI and BSA Data Body Mass Index Body Surface Area  
 35.67 kg/m 2 1.97 m 2 Your Updated Medication List  
  
   
This list is accurate as of: 2/13/18 11:26 AM.  Always use your most recent med list.  
  
  
  
  
 CLARITIN 10 mg tablet Generic drug:  loratadine Take 10 mg by mouth daily. ibuprofen 200 mg tablet Commonly known as:  MOTRIN Take  by mouth. NAPROSYN PO Take  by mouth. NEXPLANON 68 mg Impl Generic drug:  etonogestrel  
by SubDERmal route. ondansetron hcl 4 mg tablet Commonly known as:  ZOFRAN (AS HYDROCHLORIDE) Take 1 Tab by mouth every eight (8) hours as needed for Nausea. TYLENOL EXTRA STRENGTH 500 mg tablet Generic drug:  acetaminophen Take  by mouth every six (6) hours as needed for Pain. Follow-up Instructions Return for F/u after tests. To-Do List   
 02/13/2018 Imaging:  CTA CHEST W OR W WO CONT Patient Instructions Asymptomatic s/p pda repair-good functional capacity-good pda closure on echo No restriction on activity Cleared for [employer] [facility]! Kayden He introduces [employer] patient portal. Now you can access parts of your medical record, email your doctor's office, and request medication refills online. 1. In your internet browser, go to [email or web address] 2. Click on the First Time User? Click Here link in the Sign In box. You will see the New Member Sign Up page. 3. Enter your [employer] Access Code exactly as it appears below. You will not need to use this code after youve completed the sign-up process. If you do not sign up before the expiration date, you must request a new code. · [employer] Access Code: [email or web address] Expires: 4/3/2018  9:07 AM 
 
4. Enter the last four digits of your Social Security Number (xxxx) and Date of Birth (mm/dd/yyyy) as indicated and click Submit. You will be taken to the next sign-up page. 5. Create a Apex Guard ID. This will be your [employer] login ID and cannot be changed, so think of one that is secure and easy to remember. 6. Create a [employer] password. You can change your password at any time. 7. Enter your Password Reset Question and Answer. This can be used at a later time if you forget your password. 8. Enter your e-mail address. You will receive e-mail notification when new information is available in [address] 9. Click Sign Up. You can now view and download portions of your medical record. 10. Click the Download Summary menu link to download a portable copy of your medical information. If you have questions, please visit the Frequently Asked Questions section of the Apex Guard website. Remember, [employer] is NOT to be used for urgent needs. For medical emergencies, dial 911. Now available from your iPhone and Android! Please provide this summary of care documentation to your next provider. Your primary care clinician is listed as Deepak Guerra. If you have any questions after today's visit, please call [phone].

## 2018-02-13 NOTE — LETTER
[facility] 1995 2/13/2018 Dear MD Luis Weller MD 
 
I had the pleasure of evaluating  Ms. Desiree Sheldon in office today. Below are the relevant portions of my assessment and plan of care. ICD-10-CM ICD-9-CM 1. S/P PDA repair W75.785 V45.89 CTA CHEST W OR W WO CONT  
 Z87.74    
 no flow 
occluder devive in place 
normal rv and lv function Current Outpatient Prescriptions Medication Sig Dispense Refill  etonogestrel (NEXPLANON) 68 mg impl by SubDERmal route.  acetaminophen (TYLENOL EXTRA STRENGTH) 500 mg tablet Take  by mouth every six (6) hours as needed for Pain.  ibuprofen (MOTRIN) 200 mg tablet Take  by mouth.  loratadine (CLARITIN) 10 mg tablet Take 10 mg by mouth daily.  NAPROXEN (NAPROSYN PO) Take  by mouth.  ondansetron hcl (ZOFRAN) 4 mg tablet Take 1 Tab by mouth every eight (8) hours as needed for Nausea. 12 Tab 0 Orders Placed This Encounter  CTA CHEST W OR W WO CONT Standing Status:   Future Standing Expiration Date:   3/13/2019 Order Specific Question:   Is Patient Allergic to Contrast Dye? Answer:   No  
  Order Specific Question:   Is Patient Pregnant? Answer:   No  
  Order Specific Question:   STAT Creatinine as indicated Answer:   Yes If you have questions, please do not hesitate to call me. I look forward to following Ms. Desiree Sheldon along with you. Sincerely, Colt Grace MD

## 2018-02-13 NOTE — PATIENT INSTRUCTIONS
Asymptomatic s/p pda repair-good functional capacity-good pda closure on echo  No restriction on activity  Cleared for [employer]          CTA schedule February 21, 20018 @ 10:00 am

## 2018-02-20 ENCOUNTER — HOSPITAL ENCOUNTER (OUTPATIENT)
Dept: CT IMAGING | Age: 23
Discharge: HOME | End: 2018-02-20
Attending: INTERNAL MEDICINE
Payer: SELF-PAY

## 2018-02-20 DIAGNOSIS — Z87.74 S/P PDA REPAIR: ICD-10-CM

## 2018-02-20 PROCEDURE — 74011636320 HC RX REV CODE- 636/320: Performed by: INTERNAL MEDICINE

## 2018-02-20 PROCEDURE — 71275 CT ANGIOGRAPHY CHEST: CPT

## 2018-02-20 RX ADMIN — IOPAMIDOL 100 ML: 755 INJECTION, SOLUTION INTRAVENOUS at 10:18

## 2018-11-20 NOTE — PROCEDURE NOTE: CLINICAL
PROCEDURE NOTE: Eylea 2mg OS. Diagnosis: Neovascular AMD with Active CNV. Anesthesia: Topical. Prep: Betadine Drops. Prior to injection, risks/benefits/alternatives discussed including infection, loss of vision, hemorrhage, cataract, glaucoma, retinal tears or detachment. The patient wished to proceed with treatment. Betadine prep was performed. Topical anesthesia was induced with Alcaine. Additional anesthesia was achieved using drop(s) or injection checked above. A drop of Povidone-iodine 5% ophthalmic solution was instilled over the injection site and in the inferior fornix. Using the syringe provided, Eylea 2.0mg in 0.05 cc was injected into the vitreous cavity. The remainder of the Eylea in the single-use vial was then discarded in a medical waste disposal container. The needle was passed 3.0 mm posterior to the limbus in pseudophakic patients, and 3.5 mm posterior to the limbus in phakic patients. Patient tolerated procedure well. There were no complications. Injection time: *. The eye was irrigated with sterile irrigating solution. Post procedure instructions given. The patient was instructed to return for re-evaluation in approximately 4-12 weeks depending on his/her condition and was told to call immediately if vision decreases and/or if his/her eye becomes red, painful, and/or light sensitive. The patient was instructed to go to the emergency room or call 911 if unable to reach the doctor within an hour or two of trying or calling. The patient was instructed to use Artificial Tears q.i.d. p.r.n for comfort. Yue Lacey

## 2018-11-26 NOTE — PROCEDURE NOTE: CLINICAL
PROCEDURE NOTE: Eylea 2mg OD. Diagnosis: Neovascular AMD with Active CNV. Anesthesia: Topical. Prep: Betadine Drops. Prior to injection, risks/benefits/alternatives discussed including infection, loss of vision, hemorrhage, cataract, glaucoma, retinal tears or detachment. The patient wished to proceed with treatment. Betadine prep was performed. Topical anesthesia was induced with Alcaine. Additional anesthesia was achieved using drop(s) or injection checked above. A drop of Povidone-iodine 5% ophthalmic solution was instilled over the injection site and in the inferior fornix. Using the syringe provided, Eylea 2.0mg in 0.05 cc was injected into the vitreous cavity. The remainder of the Eylea in the single-use vial was then discarded in a medical waste disposal container. The needle was passed 3.0 mm posterior to the limbus in pseudophakic patients, and 3.5 mm posterior to the limbus in phakic patients. Patient tolerated procedure well. There were no complications. Injection time: 2:40PM. The eye was irrigated with sterile irrigating solution. Post procedure instructions given. The patient was instructed to return for re-evaluation in approximately 4-12 weeks depending on his/her condition and was told to call immediately if vision decreases and/or if his/her eye becomes red, painful, and/or light sensitive. The patient was instructed to go to the emergency room or call 911 if unable to reach the doctor within an hour or two of trying or calling. The patient was instructed to use Artificial Tears q.i.d. p.r.n for comfort. Jah Johns

## 2018-12-31 NOTE — PROCEDURE NOTE: CLINICAL
PROCEDURE NOTE: Eylea 2mg OS. Diagnosis: Neovascular AMD with Active CNV. Anesthesia: Topical. Prep: Betadine Drops. Prior to injection, risks/benefits/alternatives discussed including infection, loss of vision, hemorrhage, cataract, glaucoma, retinal tears or detachment. The patient wished to proceed with treatment. Betadine prep was performed. Topical anesthesia was induced with Alcaine. Additional anesthesia was achieved using drop(s) or injection checked above. A drop of Povidone-iodine 5% ophthalmic solution was instilled over the injection site and in the inferior fornix. Using the syringe provided, Eylea 2.0mg in 0.05 cc was injected into the vitreous cavity. The remainder of the Eylea in the single-use vial was then discarded in a medical waste disposal container. The needle was passed 3.0 mm posterior to the limbus in pseudophakic patients, and 3.5 mm posterior to the limbus in phakic patients. Patient tolerated procedure well. There were no complications. Injection time: 2:05PM. The eye was irrigated with sterile irrigating solution. Post procedure instructions given. The patient was instructed to return for re-evaluation in approximately 4-12 weeks depending on his/her condition and was told to call immediately if vision decreases and/or if his/her eye becomes red, painful, and/or light sensitive. The patient was instructed to go to the emergency room or call 911 if unable to reach the doctor within an hour or two of trying or calling. The patient was instructed to use Artificial Tears q.i.d. p.r.n for comfort. Boris Singh

## 2019-01-03 NOTE — PROCEDURE NOTE: CLINICAL
PROCEDURE NOTE: Eylea 2mg OD. Diagnosis: Neovascular AMD with Active CNV. Prior to injection, risks/benefits/alternatives discussed including infection, loss of vision, hemorrhage, cataract, glaucoma, retinal tears or detachment. The patient wished to proceed with treatment. Betadine prep was performed. Topical anesthesia was induced with Alcaine. Additional anesthesia was achieved using drop(s) or injection checked above. A drop of Povidone-iodine 5% ophthalmic solution was instilled over the injection site and in the inferior fornix. Using the syringe provided, Eylea 2.0mg in 0.05 cc was injected into the vitreous cavity. The remainder of the Eylea in the single-use vial was then discarded in a medical waste disposal container. The needle was passed 3.0 mm posterior to the limbus in pseudophakic patients, and 3.5 mm posterior to the limbus in phakic patients. Patient tolerated procedure well. There were no complications. Injection time: *. The eye was irrigated with sterile irrigating solution. Post procedure instructions given. The patient was instructed to return for re-evaluation in approximately 4-12 weeks depending on his/her condition and was told to call immediately if vision decreases and/or if his/her eye becomes red, painful, and/or light sensitive. The patient was instructed to go to the emergency room or call 911 if unable to reach the doctor within an hour or two of trying or calling. The patient was instructed to use Artificial Tears q.i.d. p.r.n for comfort. Boris Singh

## 2019-02-07 NOTE — PROCEDURE NOTE: CLINICAL
PROCEDURE NOTE: Eylea 2mg OS. Diagnosis: Neovascular AMD with Active CNV. Anesthesia: Topical. Prep: Betadine Drops. Prior to injection, risks/benefits/alternatives discussed including infection, loss of vision, hemorrhage, cataract, glaucoma, retinal tears or detachment. The patient wished to proceed with treatment. Betadine prep was performed. Topical anesthesia was induced with Alcaine. Additional anesthesia was achieved using drop(s) or injection checked above. A drop of Povidone-iodine 5% ophthalmic solution was instilled over the injection site and in the inferior fornix. Using the syringe provided, Eylea 2.0mg in 0.05 cc was injected into the vitreous cavity. The remainder of the Eylea in the single-use vial was then discarded in a medical waste disposal container. The needle was passed 3.0 mm posterior to the limbus in pseudophakic patients, and 3.5 mm posterior to the limbus in phakic patients. Patient tolerated procedure well. There were no complications. Injection time: *. The eye was irrigated with sterile irrigating solution. Post procedure instructions given. The patient was instructed to return for re-evaluation in approximately 4-12 weeks depending on his/her condition and was told to call immediately if vision decreases and/or if his/her eye becomes red, painful, and/or light sensitive. The patient was instructed to go to the emergency room or call 911 if unable to reach the doctor within an hour or two of trying or calling. The patient was instructed to use Artificial Tears q.i.d. p.r.n for comfort. Julisa Rodríguez

## 2019-02-11 NOTE — PROCEDURE NOTE: CLINICAL
PROCEDURE NOTE: Eylea 2mg OD. Diagnosis: Neovascular AMD with Active CNV. Anesthesia: Lidocaine 4%. Prep: Betadine Drops. Prior to injection, risks/benefits/alternatives discussed including infection, loss of vision, hemorrhage, cataract, glaucoma, retinal tears or detachment. The patient wished to proceed with treatment. Betadine prep was performed. Topical anesthesia was induced with Alcaine. Additional anesthesia was achieved using drop(s) or injection checked above. A drop of Povidone-iodine 5% ophthalmic solution was instilled over the injection site and in the inferior fornix. Using the syringe provided, Eylea 2.0mg in 0.05 cc was injected into the vitreous cavity. The remainder of the Eylea in the single-use vial was then discarded in a medical waste disposal container. The needle was passed 3.0 mm posterior to the limbus in pseudophakic patients, and 3.5 mm posterior to the limbus in phakic patients. Patient tolerated procedure well. There were no complications. Injection time: 2:25PM. The eye was irrigated with sterile irrigating solution. Post procedure instructions given. The patient was instructed to return for re-evaluation in approximately 4-12 weeks depending on his/her condition and was told to call immediately if vision decreases and/or if his/her eye becomes red, painful, and/or light sensitive. The patient was instructed to go to the emergency room or call 911 if unable to reach the doctor within an hour or two of trying or calling. The patient was instructed to use Artificial Tears q.i.d. p.r.n for comfort. Jerrod Wahl

## 2019-03-18 NOTE — PROCEDURE NOTE: CLINICAL
PROCEDURE NOTE: Eylea 2mg OS. Diagnosis: Neovascular AMD with Active CNV. Prior to injection, risks/benefits/alternatives discussed including infection, loss of vision, hemorrhage, cataract, glaucoma, retinal tears or detachment. The patient wished to proceed with treatment. Betadine prep was performed. Topical anesthesia was induced with Alcaine. Additional anesthesia was achieved using drop(s) or injection checked above. A drop of Povidone-iodine 5% ophthalmic solution was instilled over the injection site and in the inferior fornix. Using the syringe provided, Eylea 2.0mg in 0.05 cc was injected into the vitreous cavity. The remainder of the Eylea in the single-use vial was then discarded in a medical waste disposal container. The needle was passed 3.0 mm posterior to the limbus in pseudophakic patients, and 3.5 mm posterior to the limbus in phakic patients. Patient tolerated procedure well. There were no complications. Injection time: 1:00pm. The eye was irrigated with sterile irrigating solution. Post procedure instructions given. The patient was instructed to return for re-evaluation in approximately 4-12 weeks depending on his/her condition and was told to call immediately if vision decreases and/or if his/her eye becomes red, painful, and/or light sensitive. The patient was instructed to go to the emergency room or call 911 if unable to reach the doctor within an hour or two of trying or calling. The patient was instructed to use Artificial Tears q.i.d. p.r.n for comfort. Aure Avalos

## 2019-03-25 NOTE — PROCEDURE NOTE: CLINICAL
PROCEDURE NOTE: Eylea 2mg OD. Diagnosis: Neovascular AMD with Active CNV. Prior to injection, risks/benefits/alternatives discussed including infection, loss of vision, hemorrhage, cataract, glaucoma, retinal tears or detachment. The patient wished to proceed with treatment. Betadine prep was performed. Topical anesthesia was induced with Alcaine. Additional anesthesia was achieved using drop(s) or injection checked above. A drop of Povidone-iodine 5% ophthalmic solution was instilled over the injection site and in the inferior fornix. Using the syringe provided, Eylea 2.0mg in 0.05 cc was injected into the vitreous cavity. The remainder of the Eylea in the single-use vial was then discarded in a medical waste disposal container. The needle was passed 3.0 mm posterior to the limbus in pseudophakic patients, and 3.5 mm posterior to the limbus in phakic patients. Patient tolerated procedure well. There were no complications. Injection time: 224pm. The eye was irrigated with sterile irrigating solution. Post procedure instructions given. The patient was instructed to return for re-evaluation in approximately 4-12 weeks depending on his/her condition and was told to call immediately if vision decreases and/or if his/her eye becomes red, painful, and/or light sensitive. The patient was instructed to go to the emergency room or call 911 if unable to reach the doctor within an hour or two of trying or calling. The patient was instructed to use Artificial Tears q.i.d. p.r.n for comfort. Luis Wesley

## 2019-03-25 NOTE — PATIENT DISCUSSION
EYLEA AND RE-EVAL UP [place] IN 5 WKS WITH DR Balbina Christensen - TRACE EDEMA AT 5 WKS - IMPROVING.

## 2020-01-29 NOTE — PATIENT DISCUSSION
EYLEA AND REPEAT EVERY 5 WKS X 3 DOI - TRACE SRF AT 7-8 WKS - RETX AND KEEP 5 WKS TO TRY AND CLEAR REMAINING FLUID.

## 2020-01-29 NOTE — PROCEDURE NOTE: CLINICAL
PROCEDURE NOTE: Eylea PFS OD. Diagnosis: Neovascular AMD with Active CNV. Anesthesia: Topical. Prep: Betadine Drops. Prior to injection, risks/benefits/alternatives discussed including but not limited to infection, loss of vision or eye, hemorrhage, cataract, glaucoma, retinal tears or detachment. The patient wished to proceed with treatment. Betadine prep was performed. Topical anesthesia was induced with Alcaine. Additional anesthesia was achieved using drop(s) or injection checked above. A drop of Povidone-iodine 5% ophthalmic solution was instilled over the injection site and in the inferior fornix. A single use prefilled syringe of intravitreal Eylea 2mg/0.05ml was used and excess was disposed of as waste. The needle was passed 3.0 mm posterior to the limbus in pseudophakic patients, and 3.5 mm posterior to the limbus in phakic patients. Injection time: 5:30 PM.  Patient tolerated procedure well. There were no complications. The eye was irrigated with sterile irrigating solution. Post procedure instructions given. The patient was instructed to use Artificial Tears q.i.d. p.r.n for comfort. The patient was instructed to return for re-evaluation in approximately 4-12 weeks depending on his/her condition and was told to call immediately if vision decreases and/or if his/her eye becomes red, painful, and/or light sensitive. The patient was instructed to go to the emergency room or call 911 if unable to reach the doctor within an hour or two of trying or calling. Boris Singh PROCEDURE NOTE: Eylea PFS OS. Diagnosis: Neovascular AMD with Active CNV. Anesthesia: Topical. Prep: Betadine Drops. Prior to injection, risks/benefits/alternatives discussed including but not limited to infection, loss of vision or eye, hemorrhage, cataract, glaucoma, retinal tears or detachment. The patient wished to proceed with treatment. Betadine prep was performed. Topical anesthesia was induced with Alcaine. Additional anesthesia was achieved using drop(s) or injection checked above. A drop of Povidone-iodine 5% ophthalmic solution was instilled over the injection site and in the inferior fornix. A single use prefilled syringe of intravitreal Eylea 2mg/0.05ml was used and excess was disposed of as waste. The needle was passed 3.0 mm posterior to the limbus in pseudophakic patients, and 3.5 mm posterior to the limbus in phakic patients. Injection time: 5:31 PM.  Patient tolerated procedure well. There were no complications. The eye was irrigated with sterile irrigating solution. Post procedure instructions given. The patient was instructed to use Artificial Tears q.i.d. p.r.n for comfort. The patient was instructed to return for re-evaluation in approximately 4-12 weeks depending on his/her condition and was told to call immediately if vision decreases and/or if his/her eye becomes red, painful, and/or light sensitive. The patient was instructed to go to the emergency room or call 911 if unable to reach the doctor within an hour or two of trying or calling. Boris Singh

## 2020-03-04 NOTE — PROCEDURE NOTE: CLINICAL

## 2020-04-22 NOTE — PATIENT DISCUSSION
1 29 20 EYLEA AND REPEAT EVERY 5 WKS X 3 DOI - TRACE SRF AT 7-8 WKS - RETX AND KEEP 5 WKS TO TRY AND CLEAR REMAINING FLUID. Spontaneous, unlabored and symmetrical

## 2020-04-22 NOTE — PROCEDURE NOTE: CLINICAL

## 2020-05-27 NOTE — PATIENT DISCUSSION
1 29 20 EYLEA AND REPEAT EVERY 5 WKS X 3 DOI - TRACE SRF AT 7-8 WKS - RETX AND KEEP 5 WKS TO TRY AND CLEAR REMAINING FLUID.

## 2020-05-27 NOTE — PROCEDURE NOTE: CLINICAL

## 2020-07-09 NOTE — PATIENT DISCUSSION
EYLEA AND REPEAT EVERY 5 WKS X 3 DOI - MILD EDEMA AT 6 WKS - RETX AND KEEP 5 TO TRY AND CLEAR EDEMA.

## 2020-07-09 NOTE — PROCEDURE NOTE: CLINICAL

## 2020-08-13 NOTE — PROCEDURE NOTE: CLINICAL

## 2020-08-15 NOTE — PATIENT DISCUSSION
POST INJECTION EVALUATION, no signs of new infection, tear, RD, VF, EOM, CNS, Vascular or other problems or side effect from previous injection(s). No

## 2020-09-17 NOTE — PROCEDURE NOTE: CLINICAL

## 2020-10-22 NOTE — PROCEDURE NOTE: CLINICAL

## 2020-11-30 NOTE — PATIENT DISCUSSION
IOP within reasonable range TODAY - PT STILL ON CORTISONE (WILL BE ON IT FOR THE REST OF HER LIFE 2ND TUMOR ACCORDING TO PT 11 30 20).

## 2020-11-30 NOTE — PROCEDURE NOTE: CLINICAL

## 2020-12-01 ENCOUNTER — IMPORTED ENCOUNTER (OUTPATIENT)
Dept: URBAN - METROPOLITAN AREA CLINIC 1 | Facility: CLINIC | Age: 25
End: 2020-12-01

## 2020-12-01 PROBLEM — H52.13: Noted: 2020-12-01

## 2020-12-01 PROCEDURE — S0620 ROUTINE OPHTHALMOLOGICAL EXA: HCPCS

## 2020-12-01 NOTE — PATIENT DISCUSSION
1. Myopia OU: Rx was given for correction if indicated and requested. 2. Dry Eyes OU - recommend ATs BID OU routinely (sample of refresh for Contacts given). 3. Pt states h/o pituitary tumor. CTL Trials given to pt ([employer] Hydraglyde). Return for an appointment in 1-2 weeks cc with Dr. Masha Ross. Return for an appointment in 1 month 30/VF (for pituitary tumor) with Dr. Masha Ross.

## 2020-12-21 NOTE — PATIENT DISCUSSION
My findings and recommendations are based on patient's symptoms, eye exam, diagnostic testing, and records. 10-Dec-2020 14:15

## 2020-12-22 ENCOUNTER — IMPORTED ENCOUNTER (OUTPATIENT)
Dept: URBAN - METROPOLITAN AREA CLINIC 1 | Facility: CLINIC | Age: 25
End: 2020-12-22

## 2020-12-22 NOTE — PATIENT DISCUSSION
1.  CC today- Good fit and comfort. Slight change in MRx for OSCTL MRx finalizedReturn for an appointment in 1 yr 40/CC with Dr. Julius Bosch.

## 2021-02-01 ENCOUNTER — IMPORTED ENCOUNTER (OUTPATIENT)
Dept: URBAN - METROPOLITAN AREA CLINIC 1 | Facility: CLINIC | Age: 26
End: 2021-02-01

## 2021-02-01 PROBLEM — D23.2: Noted: 2021-02-01

## 2021-02-01 PROBLEM — H04.123: Noted: 2021-02-01

## 2021-02-01 PROCEDURE — 92083 EXTENDED VISUAL FIELD XM: CPT

## 2021-02-01 PROCEDURE — 99214 OFFICE O/P EST MOD 30 MIN: CPT

## 2021-02-01 NOTE — PATIENT DISCUSSION
1.  Pituitary Tumor -- VF shows no ocular involvement or peripheral loss secondary to tumor. Pt to continue following up with PCP & yearly MRI's. 2.  Dry Eyes OU -- Cont the use of ATs BID OU routinely. 3.  CTL Wear -- Returns under vision plan. Patient deferred MRx at today's visit returns under vision. Letter to PCP. Return for an appointment as scheduled with Dr. Daniel Seaman.

## 2021-03-22 NOTE — PATIENT DISCUSSION
11 30 20 EYLEA AND REPEAT EVERY 6 WKS X 3 DOI - TRACE EDEMA W/ PED AT 5-6 WKS - RETX AND EXTEND TO 6 WKS.

## 2021-03-22 NOTE — PATIENT DISCUSSION
3 22 21  EYLEA AND REPEAT EVERY 5 WKS X 3 DOI - PROM SRF AT 4 MONTHS - RETX AND KEEP 5 WKS TO TRY AND CLEAR  FLUID - PT MISSED F/U 2ND TO GETTING COVID - HAS LOSS OF MEMORY SINCE THE INFECTION.

## 2021-03-22 NOTE — PATIENT DISCUSSION
3 22 21 EYLEA AND REPEAT EVERY 5 WKS X 3 DOI - MILD SRF AT 4 MONTHS - RETX AND KEEP 5 WKS TO TRY AND CLEAR  FLUID - - PT MISSED F/U 2ND TO GETTING COVID - HAS LOSS OF MEMORY SINCE THE INFECTION.

## 2021-03-22 NOTE — PROCEDURE NOTE: CLINICAL
PROCEDURE NOTE: Eylea PFS OD. Diagnosis: Neovascular AMD with Active CNV. Anesthesia: Topical. Prep: Betadine Drops. Prior to injection, risks/benefits/alternatives discussed including but not limited to infection, loss of vision or eye, hemorrhage, cataract, glaucoma, retinal tears or detachment. The patient wished to proceed with treatment. Betadine prep was performed. Topical anesthesia was induced with Alcaine. Additional anesthesia was achieved using drop(s) or injection checked above. A drop of Povidone-iodine 5% ophthalmic solution was instilled over the injection site and in the inferior fornix. A single use prefilled syringe of intravitreal Eylea 2mg/0.05ml was used and excess was disposed of as waste. The needle was passed 3.0 mm posterior to the limbus in pseudophakic patients, and 3.5 mm posterior to the limbus in phakic patients. Injection time: 12:00 PM.  Patient tolerated procedure well. There were no complications. The eye was irrigated with sterile irrigating solution. Post procedure instructions given. The patient was instructed to use Artificial Tears q.i.d. p.r.n for comfort. The patient was instructed to return for re-evaluation in approximately 4-12 weeks depending on his/her condition and was told to call immediately if vision decreases and/or if his/her eye becomes red, painful, and/or light sensitive. The patient was instructed to go to the emergency room or call 911 if unable to reach the doctor within an hour or two of trying or calling. Landy Lupe PROCEDURE NOTE: Eylea PFS OS. Diagnosis: Neovascular AMD with Active CNV. Anesthesia: Topical. Prep: Betadine Drops. Prior to injection, risks/benefits/alternatives discussed including but not limited to infection, loss of vision or eye, hemorrhage, cataract, glaucoma, retinal tears or detachment. The patient wished to proceed with treatment. Betadine prep was performed. Topical anesthesia was induced with Alcaine. Additional anesthesia was achieved using drop(s) or injection checked above. A drop of Povidone-iodine 5% ophthalmic solution was instilled over the injection site and in the inferior fornix. A single use prefilled syringe of intravitreal Eylea 2mg/0.05ml was used and excess was disposed of as waste. The needle was passed 3.0 mm posterior to the limbus in pseudophakic patients, and 3.5 mm posterior to the limbus in phakic patients. Injection time: 12:00 PM.  Patient tolerated procedure well. There were no complications. The eye was irrigated with sterile irrigating solution. Post procedure instructions given. The patient was instructed to use Artificial Tears q.i.d. p.r.n for comfort. The patient was instructed to return for re-evaluation in approximately 4-12 weeks depending on his/her condition and was told to call immediately if vision decreases and/or if his/her eye becomes red, painful, and/or light sensitive. The patient was instructed to go to the emergency room or call 911 if unable to reach the doctor within an hour or two of trying or calling. Landy Andrade

## 2021-04-26 NOTE — PROCEDURE NOTE: CLINICAL

## 2021-04-26 NOTE — PATIENT DISCUSSION
Final Anesthesia Post-op Assessment    Patient: Jennifer Rodriguez  Procedure(s) Performed: RIGHT WIRE LOCALIZED EXCISIONAL BIOPSY  Anesthesia type: Monitor Anesthesia Care    Vital Last Value   Temperature 36.3 °C (97.4 °F) (08/22/17 1430)   Pulse 108 (08/22/17 1041)   Respiratory Rate 16 (08/22/17 1430)   Non-Invasive   Blood Pressure 112/70 (08/22/17 1430)   Arterial  Blood Pressure     Pulse Oximetry 95 % (08/22/17 1430)     Last 24 I/O:   Intake/Output Summary (Last 24 hours) at 08/22/17 1446  Last data filed at 08/22/17 1417   Gross per 24 hour   Intake             1000 ml   Output                5 ml   Net              995 ml       PATIENT LOCATION: Day Surgery  POST-OP VITAL SIGNS: stable  LEVEL OF CONSCIOUSNESS: participates in exam, awake, oriented, answers questions appropriately and alert  RESPIRATORY STATUS: spontaneous ventilation, unassisted and room air  CARDIOVASCULAR: blood pressure returned to baseline and stable  HYDRATION: euvolemic    PAIN MANAGEMENT: well controlled  NAUSEA: None  AIRWAY PATENCY: patent  POST-OP ASSESSMENT: no complications, patient tolerated procedure well with no complications, no evidence of recall and sufficiently recovered from acute administration of anesthesia effects and able to participate in evaluation  COMPLICATIONS: none  Comments: Patient has been reassessed and is ready/appropriate for discharge.       Recommended OBSERVATION and continued MONITORING for progression.

## 2022-01-11 ENCOUNTER — IMPORTED ENCOUNTER (OUTPATIENT)
Dept: URBAN - METROPOLITAN AREA CLINIC 1 | Facility: CLINIC | Age: 27
End: 2022-01-11

## 2022-01-11 PROBLEM — H52.223: Noted: 2022-01-11

## 2022-01-11 PROBLEM — H52.13: Noted: 2022-01-11

## 2022-01-11 PROCEDURE — S0621 ROUTINE OPHTHALMOLOGICAL EXA: HCPCS

## 2022-01-11 NOTE — PATIENT DISCUSSION
1. Myopia w/ Astigmatism OU -- Rx was given for correction if indicated and requested. 2. Dry Eyes OU -- Cont the use of ATs BID OU routinely. 3.  H/o Pituitary Tumor --  Pt to continue following up with PCP & yearly MRI's. Finalized CTL Rx and given to patient ([employer] Hydraglyde). Return for an appointment in 1 year 40/cc with Dr. Tay Tan.

## 2022-03-19 PROBLEM — Z02.1 PRE-EMPLOYMENT EXAMINATION: Status: ACTIVE | Noted: 2018-01-03

## 2022-03-19 PROBLEM — Z87.74 S/P PDA REPAIR: Status: ACTIVE | Noted: 2018-01-03

## 2022-04-02 ASSESSMENT — KERATOMETRY
OS_K1POWER_DIOPTERS: 44.25
OD_K1POWER_DIOPTERS: 44.25
OD_AXISANGLE2_DEGREES: 017
OS_K2POWER_DIOPTERS: 42.75
OD_K2POWER_DIOPTERS: 42.75
OS_AXISANGLE2_DEGREES: 172

## 2022-04-02 ASSESSMENT — VISUAL ACUITY
OS_SC: 20/20
OD_SC: 20/20
OS_SC: 20/20
OS_SC: 20/20
OD_CC: 20/20
OS_CC: J1+
OD_SC: 20/20
OS_CC: 20/20
OD_CC: J1+
OD_SC: 20/20

## 2022-04-02 ASSESSMENT — TONOMETRY
OD_IOP_MMHG: 17
OD_IOP_MMHG: 17
OS_IOP_MMHG: 17
OD_IOP_MMHG: 17

## 2023-01-31 RX ORDER — IBUPROFEN 200 MG
600 TABLET ORAL EVERY 6 HOURS PRN
COMMUNITY

## 2023-01-31 RX ORDER — ACETAMINOPHEN 500 MG
TABLET ORAL EVERY 6 HOURS PRN
COMMUNITY

## 2023-01-31 NOTE — PATIENT DISCUSSION
11 20 18 MILD ELEVATION IOP 24 OU AFTER DILATION - 21 BEFORE DILATION - ON CORTISONE. Target Cumulative Dosage (In Mg/Kg): 135

## 2025-03-10 ENCOUNTER — APPOINTMENT (OUTPATIENT)
Dept: URBAN - METROPOLITAN AREA CLINIC 85 | Facility: CLINIC | Age: 30
Setting detail: DERMATOLOGY
End: 2025-03-10

## 2025-03-10 DIAGNOSIS — L81.4 OTHER MELANIN HYPERPIGMENTATION: ICD-10-CM | Status: UNCHANGED

## 2025-03-10 DIAGNOSIS — Z80.8 FAMILY HISTORY OF MALIGNANT NEOPLASM OF OTHER ORGANS OR SYSTEMS: ICD-10-CM

## 2025-03-10 DIAGNOSIS — D49.2 NEOPLASM OF UNSPECIFIED BEHAVIOR OF BONE, SOFT TISSUE, AND SKIN: ICD-10-CM

## 2025-03-10 DIAGNOSIS — D22 MELANOCYTIC NEVI: ICD-10-CM

## 2025-03-10 DIAGNOSIS — D18.0 HEMANGIOMA: ICD-10-CM | Status: UNCHANGED

## 2025-03-10 DIAGNOSIS — L57.8 OTHER SKIN CHANGES DUE TO CHRONIC EXPOSURE TO NONIONIZING RADIATION: ICD-10-CM | Status: INADEQUATELY CONTROLLED

## 2025-03-10 PROBLEM — D22.72 MELANOCYTIC NEVI OF LEFT LOWER LIMB, INCLUDING HIP: Status: ACTIVE | Noted: 2025-03-10

## 2025-03-10 PROBLEM — D18.01 HEMANGIOMA OF SKIN AND SUBCUTANEOUS TISSUE: Status: ACTIVE | Noted: 2025-03-10

## 2025-03-10 PROBLEM — D22.62 MELANOCYTIC NEVI OF LEFT UPPER LIMB, INCLUDING SHOULDER: Status: ACTIVE | Noted: 2025-03-10

## 2025-03-10 PROBLEM — D22.5 MELANOCYTIC NEVI OF TRUNK: Status: ACTIVE | Noted: 2025-03-10

## 2025-03-10 PROBLEM — D22.71 MELANOCYTIC NEVI OF RIGHT LOWER LIMB, INCLUDING HIP: Status: ACTIVE | Noted: 2025-03-10

## 2025-03-10 PROBLEM — D22.61 MELANOCYTIC NEVI OF RIGHT UPPER LIMB, INCLUDING SHOULDER: Status: ACTIVE | Noted: 2025-03-10

## 2025-03-10 PROCEDURE — ? BIOPSY BY SHAVE METHOD

## 2025-03-10 PROCEDURE — 11102 TANGNTL BX SKIN SINGLE LES: CPT

## 2025-03-10 PROCEDURE — 11103 TANGNTL BX SKIN EA SEP/ADDL: CPT

## 2025-03-10 PROCEDURE — ? COUNSELING

## 2025-03-10 PROCEDURE — ? SUNSCREEN RECOMMENDATIONS

## 2025-03-10 PROCEDURE — 99203 OFFICE O/P NEW LOW 30 MIN: CPT | Mod: 25

## 2025-03-10 PROCEDURE — ? EDUCATIONAL RESOURCES PROVIDED

## 2025-03-10 ASSESSMENT — LOCATION DETAILED DESCRIPTION DERM
LOCATION DETAILED: RIGHT SUPERIOR MEDIAL LOWER BACK
LOCATION DETAILED: LEFT PROXIMAL CALF
LOCATION DETAILED: RIGHT FOREHEAD
LOCATION DETAILED: RIGHT ANTERIOR PROXIMAL UPPER ARM
LOCATION DETAILED: LEFT ANTERIOR DISTAL THIGH
LOCATION DETAILED: LEFT ANTERIOR DISTAL UPPER ARM
LOCATION DETAILED: LEFT VENTRAL PROXIMAL FOREARM
LOCATION DETAILED: RIGHT LATERAL ABDOMEN
LOCATION DETAILED: RIGHT ANTERIOR DISTAL THIGH
LOCATION DETAILED: LEFT LATERAL SUPERIOR CHEST
LOCATION DETAILED: RIGHT ANTERIOR PROXIMAL THIGH
LOCATION DETAILED: RIGHT POPLITEAL SKIN
LOCATION DETAILED: PERIUMBILICAL SKIN
LOCATION DETAILED: EPIGASTRIC SKIN
LOCATION DETAILED: RIGHT VENTRAL PROXIMAL FOREARM
LOCATION DETAILED: LEFT ANTERIOR PROXIMAL UPPER ARM
LOCATION DETAILED: LEFT INFERIOR MEDIAL MIDBACK
LOCATION DETAILED: LEFT PROXIMAL POSTERIOR UPPER ARM
LOCATION DETAILED: RIGHT PROXIMAL POSTERIOR UPPER ARM
LOCATION DETAILED: LEFT LATERAL ABDOMEN
LOCATION DETAILED: RIGHT SUPERIOR UPPER BACK
LOCATION DETAILED: RIGHT ANTERIOR DISTAL UPPER ARM
LOCATION DETAILED: RIGHT LATERAL SUPERIOR CHEST
LOCATION DETAILED: LEFT SUPERIOR UPPER BACK
LOCATION DETAILED: RIGHT SUPERIOR MEDIAL MIDBACK
LOCATION DETAILED: INFERIOR THORACIC SPINE
LOCATION DETAILED: LEFT MEDIAL MALAR CHEEK
LOCATION DETAILED: LEFT INFERIOR MEDIAL LOWER BACK
LOCATION DETAILED: RIGHT INFERIOR MEDIAL MIDBACK

## 2025-03-10 ASSESSMENT — LOCATION SIMPLE DESCRIPTION DERM
LOCATION SIMPLE: LEFT FOREARM
LOCATION SIMPLE: LEFT CALF
LOCATION SIMPLE: RIGHT POPLITEAL SKIN
LOCATION SIMPLE: LEFT POSTERIOR UPPER ARM
LOCATION SIMPLE: LEFT THIGH
LOCATION SIMPLE: LEFT UPPER BACK
LOCATION SIMPLE: RIGHT THIGH
LOCATION SIMPLE: RIGHT UPPER ARM
LOCATION SIMPLE: RIGHT UPPER BACK
LOCATION SIMPLE: UPPER BACK
LOCATION SIMPLE: RIGHT FOREHEAD
LOCATION SIMPLE: CHEST
LOCATION SIMPLE: RIGHT POSTERIOR UPPER ARM
LOCATION SIMPLE: ABDOMEN
LOCATION SIMPLE: RIGHT FOREARM
LOCATION SIMPLE: RIGHT LOWER BACK
LOCATION SIMPLE: LEFT UPPER ARM
LOCATION SIMPLE: LEFT CHEEK
LOCATION SIMPLE: LEFT LOWER BACK

## 2025-03-10 ASSESSMENT — LOCATION ZONE DERM
LOCATION ZONE: TRUNK
LOCATION ZONE: ARM
LOCATION ZONE: FACE
LOCATION ZONE: LEG

## 2025-03-10 NOTE — PROCEDURE: BIOPSY BY SHAVE METHOD
Body Location Override (Optional - Billing Will Still Be Based On Selected Body Map Location If Applicable): right mid forehead
Detail Level: Detailed
Depth Of Biopsy: dermis
Was A Bandage Applied: Yes
Size Of Lesion In Cm: 0.4
X Size Of Lesion In Cm: 0
Biopsy Type: H and E
Biopsy Method: Personna blade
Anesthesia Type: 1% lidocaine with epinephrine
Anesthesia Volume In Cc: 0.2
Hemostasis: Electrocautery and Aluminum Chloride
Wound Care: Aquaphor
Dressing: bandage
Destruction After The Procedure: No
Type Of Destruction Used: Curettage
Curettage Text: The wound bed was treated with curettage after the biopsy was performed.
Cryotherapy Text: The wound bed was treated with cryotherapy after the biopsy was performed.
Electrodesiccation Text: The wound bed was treated with electrodesiccation after the biopsy was performed.
Electrodesiccation And Curettage Text: The wound bed was treated with electrodesiccation and curettage after the biopsy was performed.
Silver Nitrate Text: The wound bed was treated with silver nitrate after the biopsy was performed.
Lab: -[id number]
Medical Necessity Information: It is in your best interest to select a reason for this procedure from the list below. All of these items fulfill various CMS LCD requirements except the new and changing color options.
Consent: Written consent was obtained and risks were reviewed including but not limited to scarring, infection, bleeding, scabbing, incomplete removal, nerve damage and allergy to anesthesia.
Post-Care Instructions: I reviewed with the patient in detail post-care instructions. Patient is to keep the biopsy site dry overnight, and then apply bacitracin twice daily until healed. Patient may apply hydrogen peroxide soaks to remove any crusting.
Notification Instructions: Patient will be notified of biopsy results. However, patient instructed to call the office if not contacted within 2 weeks.
Billing Type: Third-Party Bill
Information: Selecting Yes will display possible errors in your note based on the variables you have selected. This validation is only offered as a suggestion for you. PLEASE NOTE THAT THE VALIDATION TEXT WILL BE REMOVED WHEN YOU FINALIZE YOUR NOTE. IF YOU WANT TO FAX A PRELIMINARY NOTE YOU WILL NEED TO TOGGLE THIS TO 'NO' IF YOU DO NOT WANT IT IN YOUR FAXED NOTE.
Body Location Override (Optional - Billing Will Still Be Based On Selected Body Map Location If Applicable): left medial abdomen